# Patient Record
Sex: FEMALE | Race: WHITE
[De-identification: names, ages, dates, MRNs, and addresses within clinical notes are randomized per-mention and may not be internally consistent; named-entity substitution may affect disease eponyms.]

---

## 2021-02-12 ENCOUNTER — HOSPITAL ENCOUNTER (OUTPATIENT)
Dept: HOSPITAL 75 - RAD | Age: 21
End: 2021-02-12
Attending: OBSTETRICS & GYNECOLOGY
Payer: COMMERCIAL

## 2021-02-12 DIAGNOSIS — N94.4: Primary | ICD-10-CM

## 2021-02-12 PROCEDURE — 76856 US EXAM PELVIC COMPLETE: CPT

## 2021-02-12 PROCEDURE — 76830 TRANSVAGINAL US NON-OB: CPT

## 2021-02-12 NOTE — DIAGNOSTIC IMAGING REPORT
PROCEDURE:  Pelvic complete, transabdominal and transvaginal

sonogram. Limited pelvic doppler.



TECHNIQUE: Multiple real-time grayscale images were obtained of

the pelvis in various projections transabdominally and

transvaginally. Limited pelvic duplex images were obtained.



HISTORY: Abnormal uterine bleeding.



COMPARISON: None available.



FINDINGS: 



Uterus: The uterus is anteverted and measures 8.5 x 4.4 x 5.1 cm.

The myometrium is homogeneous without fibroids.



Endometrium: The endometrium is normal in thickness and measures

1.0 cm. There is no fluid within the endometrial cavity.



Adnexa: The right ovary is nonvisualized secondary to overlying

bowel gas. The left ovary contains a 3.0 cm unilocular cyst. The

left ovary measures 3.8 x 2.4 x 3.1 cm. Duplex images reveal

normal vascular flow to the left ovary. 



Other: There is no free fluid within the pelvis.



IMPRESSION:



1. Nonvisualized right ovary. Otherwise unremarkable pelvic

ultrasound.



Dictated by: 



  Dictated on workstation # [id number]

## 2021-07-31 ENCOUNTER — HOSPITAL ENCOUNTER (EMERGENCY)
Dept: HOSPITAL 75 - ER | Age: 21
Discharge: HOME | End: 2021-07-31
Payer: COMMERCIAL

## 2021-07-31 VITALS — SYSTOLIC BLOOD PRESSURE: 142 MMHG | DIASTOLIC BLOOD PRESSURE: 84 MMHG

## 2021-07-31 VITALS — WEIGHT: 115.08 LBS | BODY MASS INDEX: 23.2 KG/M2 | HEIGHT: 59.02 IN
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
## 2021-07-31 DIAGNOSIS — M54.5: Primary | ICD-10-CM
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
## 2021-07-31 PROCEDURE — 72100 X-RAY EXAM L-S SPINE 2/3 VWS: CPT

## 2021-07-31 PROCEDURE — 84703 CHORIONIC GONADOTROPIN ASSAY: CPT
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
## 2021-10-15 ENCOUNTER — HOSPITAL ENCOUNTER (OUTPATIENT)
Dept: HOSPITAL 75 - RAD | Age: 21
End: 2021-10-15
Attending: SURGERY
Payer: COMMERCIAL

## 2021-10-15 DIAGNOSIS — N63.31: Primary | ICD-10-CM

## 2021-10-15 DIAGNOSIS — N64.4: ICD-10-CM

## 2021-10-15 NOTE — DIAGNOSTIC IMAGING REPORT
INDICATION: Painful lump in the right axilla for 2 months, waxing

and waning.



Sonographic interrogation of the area of painful lump in the

right axilla was performed. There is an area of shadowing at this

location which is difficult to measure. No discrete fluid

collection is identified. No other abnormalities are identified.



IMPRESSION: 



There is an area of ill-defined shadowing at the area of lump in

the right axilla, indeterminate. Diagnostic mammography is

recommended for further evaluation.



BI-RADS Category 0



ACR BI-RADS Category 0: Incomplete. (Needs additional imaging

evaluation).

Result letter will be mailed to the patient.

Note: At least 10% of breast cancer is not imaged by mammography.



Dictated by: 



  Dictated on workstation # [id number]

## 2021-10-20 ENCOUNTER — HOSPITAL ENCOUNTER (OUTPATIENT)
Dept: HOSPITAL 75 - RAD | Age: 21
End: 2021-10-20
Attending: SURGERY
Payer: COMMERCIAL

## 2021-10-20 DIAGNOSIS — R22.9: Primary | ICD-10-CM

## 2021-10-20 PROCEDURE — 77065 DX MAMMO INCL CAD UNI: CPT

## 2021-10-20 NOTE — DIAGNOSTIC IMAGING REPORT
INDICATION: 

Painful lump in the right axilla.



COMPARISON: 

Correlation is made with an ultrasound performed several days

earlier.



TECHNIQUE: 

Unilateral right 2D and 3D exaggerated CC and MLO views of the

right breast were performed. A BB marker was placed at the area

of palpable abnormality in the right axilla. The current study

was evaluated with a Computer Aided Detection (CAD) system. 



FINDINGS:

The right breast is heterogeneously dense, limiting the

sensitivity of mammography. No concerning mass is identified at

the area of palpable abnormality in the right axilla. No

mammographic abnormality is seen. No suspicious

microcalcifications are identified.  



IMPRESSION: 

No concerning mammographic findings are identified. The area of

heterogeneous shadowing noted by ultrasound is indeterminate but

could perhaps be inflammatory. Perhaps a followup ultrasound of

the right axilla after a course of antibiotic therapy could be

performed in 3-4 weeks to confirm stability and/or clearing.



ACR BI-RADS Category 3: Probably benign findings.

Result letter will be mailed to the patient.

Note: At least 10% of breast cancer is not imaged by mammography.



Dictated by: 



  Dictated on workstation # [id number]

## 2021-11-07 ENCOUNTER — HOSPITAL ENCOUNTER (EMERGENCY)
Dept: HOSPITAL 75 - ER | Age: 21
Discharge: LEFT BEFORE DISCHARGE | End: 2021-11-07
Payer: COMMERCIAL

## 2021-11-07 DIAGNOSIS — J02.9: ICD-10-CM

## 2021-11-07 DIAGNOSIS — R11.0: Primary | ICD-10-CM
